# Patient Record
Sex: MALE | Race: WHITE | NOT HISPANIC OR LATINO | Employment: OTHER | URBAN - METROPOLITAN AREA
[De-identification: names, ages, dates, MRNs, and addresses within clinical notes are randomized per-mention and may not be internally consistent; named-entity substitution may affect disease eponyms.]

---

## 2018-03-27 ENCOUNTER — INITIAL CONSULT (OUTPATIENT)
Dept: DERMATOLOGY | Facility: CLINIC | Age: 83
End: 2018-03-27
Payer: MEDICARE

## 2018-03-27 VITALS
DIASTOLIC BLOOD PRESSURE: 68 MMHG | HEIGHT: 70 IN | HEART RATE: 72 BPM | WEIGHT: 180 LBS | BODY MASS INDEX: 25.77 KG/M2 | SYSTOLIC BLOOD PRESSURE: 103 MMHG

## 2018-03-27 DIAGNOSIS — D04.22 SQUAMOUS CELL CARCINOMA IN SITU OF SKIN OF EAR, LEFT: Primary | ICD-10-CM

## 2018-03-27 PROCEDURE — 99202 OFFICE O/P NEW SF 15 MIN: CPT | Mod: S$GLB,,, | Performed by: DERMATOLOGY

## 2018-03-27 PROCEDURE — 99999 PR PBB SHADOW E&M-NEW PATIENT-LVL III: CPT | Mod: PBBFAC,,, | Performed by: DERMATOLOGY

## 2018-03-27 RX ORDER — SPIRONOLACTONE 25 MG/1
12.5 TABLET ORAL DAILY
COMMUNITY
End: 2021-07-04

## 2018-03-27 RX ORDER — ASPIRIN 81 MG/1
81 TABLET ORAL DAILY
COMMUNITY
End: 2018-03-27

## 2018-03-27 RX ORDER — METOPROLOL SUCCINATE 50 MG/1
1 TABLET, EXTENDED RELEASE ORAL DAILY
COMMUNITY
Start: 2018-03-22

## 2018-03-27 RX ORDER — APIXABAN 5 MG/1
1 TABLET, FILM COATED ORAL 2 TIMES DAILY
COMMUNITY
Start: 2018-02-12 | End: 2021-03-15

## 2018-03-27 RX ORDER — CLOPIDOGREL BISULFATE 75 MG/1
1 TABLET ORAL DAILY
COMMUNITY
Start: 2018-01-31

## 2018-03-27 RX ORDER — ROSUVASTATIN CALCIUM 40 MG/1
1 TABLET, COATED ORAL DAILY
COMMUNITY
Start: 2018-03-22 | End: 2021-02-04

## 2018-03-27 RX ORDER — PANTOPRAZOLE SODIUM 20 MG/1
1 TABLET, DELAYED RELEASE ORAL DAILY
COMMUNITY
Start: 2018-02-26

## 2018-03-27 RX ORDER — FUROSEMIDE 20 MG/1
10 TABLET ORAL DAILY
COMMUNITY
End: 2021-06-27

## 2018-03-27 RX ORDER — SACUBITRIL AND VALSARTAN 49; 51 MG/1; MG/1
1 TABLET, FILM COATED ORAL 2 TIMES DAILY
COMMUNITY
Start: 2018-03-26 | End: 2021-07-04

## 2018-03-27 RX ORDER — DEXTROMETHORPHAN HYDROBROMIDE, GUAIFENESIN 5; 100 MG/5ML; MG/5ML
650 LIQUID ORAL EVERY 8 HOURS
COMMUNITY

## 2018-03-27 NOTE — PROGRESS NOTES
REFERRING MD:  Chandler Perez M.D.    CHIEF COMPLAINT:  New patient being consulted for Mohs' surgery evaluation.    HISTORY OF PRESENT ILLNESS:  86 y.o. male presents with a 3 month(s) history of growth on the L superior posterior helix. (+) scaly. Pt has a h/o Mohs with Dr. Oviedo in April 2008 on the lower helical rim of the left ear.    Negative for scabbing.  Positive for crusting.  Negative for bleeding.  Negative for itching.    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     No prior treatment.    Pacemaker: Yes  Defibrillator: Yes  Artificial joints: Yes- R knee and bilateral hips.  Artificial heart valves: No    PAST MEDICAL HISTORY:  Past Medical History:   Diagnosis Date    Atrial fibrillation     Congestive heart failure     Coronary artery disease     Defibrillator discharge     Diabetes mellitus     GERD (gastroesophageal reflux disease)     Pacemaker     Squamous cell carcinoma     Stroke     full recovery    URI (upper respiratory infection) 7/2015 and 10/2017       PAST SURGICAL HISTORY:  Past Surgical History:   Procedure Laterality Date    ESOPHAGEAL DILATION  1/2015 and 7/2015    POSTERIOR FUSION LUMBAR SPINE W/ CORPECTOMY      l1-2-3 (1948)    stend LAD  03/2014    TOTAL HIP ARTHROPLASTY      TOTAL KNEE ARTHROPLASTY Right         SOCIAL HISTORY:  Dependencies:  never smoked    PERTINENT MEDICATIONS:  See medications list.  Plavix and Apixaban    ALLERGIES:  Codeine and Penicillins    ROS:  Skin: See HPI  Constitutional: No fatigue, fever, malaise, weight loss, or night sweats.  Cardiovascular: No chest pain, palpitations, or edema.  Respiratory: No coughing, wheezing, SOB, or sputum production.    Physical Exam   Skin:               General: Mood and affect normal. Alert and orient X3. Normal appearance.  Eyelids:  no suspicious lesions  Head/Face: L superior posterior helix with a 10 x 10 mm crusted plaque located 3.5 cm inferiorly from the left superior ear attachment  and 7 cm superiorly from the left inferior lobe attachment.   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    PLAN:  The diagnosis and the pathology report were discussed in detail with the patient. Treatment options were reviewed, including Mohs Micrographic Surgery, radiation, topical therapy, and standard excision.  After careful review of patient's history and physical exam, and after discussion of treatment options, the decision was made to perform Mohs micrographic surgery.    Scheduled patient for Mohs Micrographic Surgery. Risks, benefits, and alternatives of Mohs' surgery discussed with the patient. Discussed repair options including complex closure, skin flap, skin graft and second intention healing with the patient. Pre-operative instructions provided to the patient. Will like to wait a few weeks for area to heal up prior to doing Mohs.  Also, he already obtained clearance from his cardiologist to stop Plavix 3 days prior and Eliquis 1 day prior to procedure.

## 2018-04-12 ENCOUNTER — PROCEDURE VISIT (OUTPATIENT)
Dept: DERMATOLOGY | Facility: CLINIC | Age: 83
End: 2018-04-12
Payer: MEDICARE

## 2018-04-12 VITALS
HEIGHT: 70 IN | WEIGHT: 180 LBS | DIASTOLIC BLOOD PRESSURE: 62 MMHG | SYSTOLIC BLOOD PRESSURE: 94 MMHG | HEART RATE: 69 BPM | BODY MASS INDEX: 25.77 KG/M2

## 2018-04-12 DIAGNOSIS — C44.229 SQUAMOUS CELL CARCINOMA OF SKIN OF HELIX, LEFT: Primary | ICD-10-CM

## 2018-04-12 PROCEDURE — 99499 UNLISTED E&M SERVICE: CPT | Mod: S$GLB,,, | Performed by: DERMATOLOGY

## 2018-04-12 PROCEDURE — 17311 MOHS 1 STAGE H/N/HF/G: CPT | Mod: S$GLB,,, | Performed by: DERMATOLOGY

## 2018-04-12 PROCEDURE — 17312 MOHS ADDL STAGE: CPT | Mod: S$GLB,,, | Performed by: DERMATOLOGY

## 2018-04-12 PROCEDURE — 13152 CMPLX RPR E/N/E/L 2.6-7.5 CM: CPT | Mod: 51,S$GLB,, | Performed by: DERMATOLOGY

## 2018-04-12 RX ORDER — AZITHROMYCIN 250 MG/1
250 TABLET, FILM COATED ORAL SEE ADMIN INSTRUCTIONS
Qty: 6 TABLET | Refills: 0 | Status: SHIPPED | OUTPATIENT
Start: 2018-04-12

## 2018-04-12 RX ORDER — TRAMADOL HYDROCHLORIDE 50 MG/1
50 TABLET ORAL 2 TIMES DAILY PRN
Qty: 15 TABLET | Refills: 0 | Status: SHIPPED | OUTPATIENT
Start: 2018-04-12

## 2018-04-12 NOTE — PROGRESS NOTES
PROCEDURE: Mohs' Micrographic Surgery    INDICATION: Location in mask areas of face including central face, nose, eyelids, eyebrows, lips, chin, preauricular, temple, and ear. Biopsy-proven skin cancer of cosmetically and functionally important areas, including head, neck, genital, hand, foot, or areas known for having difficulty in healing, such as the lower anterior legs. Tumor with ill-defined borders.    REFERRING MD: Chandler Perez M.D.    CASE NUMBER:     ANESTHETIC: 4.5 cc 0.5% Lidocaine with Epi 1:200,000 mixed 1:1 with 0.5% Bupivacaine    SURGICAL PREP: Betadine    SURGEON: Ayanna Vela MD    ASSISTANTS: Amy Marcelo PA-C and Tracie Brown, Surg Tech    PREOPERATIVE DIAGNOSIS: squamous cell carcinoma in situ    POSTOPERATIVE DIAGNOSIS: squamous cell carcinoma in situ    PATHOLOGIC DIAGNOSIS: squamous cell carcinoma in situ; superficially invasive SCC    HISTOLOGY OF SPECIMENS IN FIRST STAGE:   Debulking tumor specimen revealed superficially invasive SCC resulting in an addendum to the initial diagnosis.  Tumor Type: Tumor seen. Superficial squamous cell carcinoma: Proliferation of squamous cells exhibiting atypia and infiltrating within the superficial papillary dermis.  Invasive squamous cell carcinoma: Proliferation of squamous cells exhibiting atypia and infiltrating within the dermis.   Depth of Invasion: epidermis and dermis  Perineural Invasion: No    HISTOLOGY OF SPECIMENS IN SUBSEQUENT STAGES:  Tumor Type: Tumor seen. Epidermal margin missing.   Depth of Invasion: epidermis  Perineural Invasion: No    STAGES OF MOHS' SURGERY PERFORMED: 3    TUMOR-FREE PLANE ACHIEVED: Yes - with removal of underlying cartilage    HEMOSTASIS: electrocoagulation after magnet placed over defibrillator without event     SPECIMENS: 6 (3 in stage A, 2 in stage B and 1 in stage C)    LOCATION: left superior posterior helix. Patient verified location.    INITIAL LESION SIZE: 0.7 x 0.8 cm    FINAL DEFECT SIZE: 1.3 x 1.8  "cm    WOUND REPAIR/DISPOSITION: The patient tolerated Mohs' Micrographic Surgery for a squamous cell carcinoma in situ very well. When the tumor was completely removed, a repair of the surgical defect was undertaken.      PROCEDURE: Complex Linear Repair    INDICATION: Status post Mohs' Micrographic Surgery for squamous cell carcinoma in situ.    CASE NUMBER:     SURGEON: Ayanna Vela MD    ASSISTANTS: Amy Marcelo PA-C and Tracie Brown Surg Tech    ANESTHETIC: 2 cc 1% Lidocaine with Epinephrine 1:100,000    SURGICAL PREP: Betadine    LOCATION: left superior posterior helix    DEFECT SIZE: 1.3 x 1.8 cm    WOUND REPAIR/DISPOSITION:  After the patient's carcinoma had been completely removed with Mohs' Micrographic Surgery, a repair of the surgical defect was undertaken. The patient was returned to the operating suite where the area of left superior posterior helix was prepped, draped, and anesthetized in the usual sterile fashion. The wound was widely undermined in all directions. Then, electrocoagulation after magnet placed over defibrillator without event was used to obtain meticulous hemostasis. 5-0 Vicryl buried vertical mattress sutures were placed into the subcutaneous and dermal plane to close the wound and noel the cutaneous wound edge. Bilateral dog ears were identified and were removed by a standard Burow's triangle technique. The cutaneous wound edges were closed using interrupted 5-0 Prolene suture.    The patient tolerated the procedure well.    The area was cleaned and dressed appropriately and the patient was given wound care instructions, as well as appointment for follow-up evaluation. Pt was placed on Tramadol 50 mg BID prn postop pain and a Z-afsaneh.    LENGTH OF REPAIR: 3 cm    Vitals:    04/12/18 0752 04/12/18 1149   BP:  94/62   BP Location:  Right arm   Patient Position:  Sitting   BP Method:  Small (Automatic)   Pulse:  69   Weight: 81.6 kg (180 lb)    Height: 5' 10" (1.778 m)          "

## 2018-04-19 ENCOUNTER — CLINICAL SUPPORT (OUTPATIENT)
Dept: DERMATOLOGY | Facility: CLINIC | Age: 83
End: 2018-04-19
Payer: MEDICARE

## 2018-04-19 PROCEDURE — 99999 PR PBB SHADOW E&M-EST. PATIENT-LVL III: CPT | Mod: PBBFAC,,,

## 2018-04-19 NOTE — PROGRESS NOTES
86 y.o. male patient is here for suture removal following Mohs' surgery.    Patient reports no problems with left superior posterior helix.    WOUND PE:  The left superior posterior helix sutures intact. Wound healing well. Good skin edges. No signs or symptoms of infection.    IMPRESSION:  Healing operative site from Mohs' surgery SCC, left superior posterior helix s/p Mohs with CLC, postop day # 7.    PLAN:  Sutures removed today. Steri-strips applied.  Continue wound care.  Keep moist with Aquaphor.    RTC:  In 3-6 months with Chandler Perez M.D. for skin check or sooner if new concern arises.

## 2019-03-04 ENCOUNTER — TELEPHONE (OUTPATIENT)
Dept: NEUROLOGY | Facility: CLINIC | Age: 84
End: 2019-03-04

## 2019-03-04 NOTE — TELEPHONE ENCOUNTER
----- Message from Josiah Birmingham sent at 3/4/2019  2:52 PM CST -----  Contact: Maritza ( daughter ) @ [phone]  Caller is requesting a return phone call regarding 3-6th appt, pls call

## 2019-03-06 ENCOUNTER — OFFICE VISIT (OUTPATIENT)
Dept: NEUROLOGY | Facility: CLINIC | Age: 84
End: 2019-03-06
Payer: MEDICARE

## 2019-03-06 VITALS
DIASTOLIC BLOOD PRESSURE: 76 MMHG | HEART RATE: 84 BPM | HEIGHT: 70 IN | BODY MASS INDEX: 27.58 KG/M2 | WEIGHT: 192.69 LBS | SYSTOLIC BLOOD PRESSURE: 117 MMHG

## 2019-03-06 DIAGNOSIS — G56.02 CARPAL TUNNEL SYNDROME, LEFT: Primary | ICD-10-CM

## 2019-03-06 DIAGNOSIS — I48.20 CHRONIC A-FIB: ICD-10-CM

## 2019-03-06 DIAGNOSIS — E11.21 CONTROLLED TYPE 2 DIABETES MELLITUS WITH DIABETIC NEPHROPATHY, WITHOUT LONG-TERM CURRENT USE OF INSULIN: ICD-10-CM

## 2019-03-06 PROCEDURE — 99204 OFFICE O/P NEW MOD 45 MIN: CPT | Mod: GC,S$GLB,, | Performed by: STUDENT IN AN ORGANIZED HEALTH CARE EDUCATION/TRAINING PROGRAM

## 2019-03-06 PROCEDURE — 99204 PR OFFICE/OUTPT VISIT, NEW, LEVL IV, 45-59 MIN: ICD-10-PCS | Mod: GC,S$GLB,, | Performed by: STUDENT IN AN ORGANIZED HEALTH CARE EDUCATION/TRAINING PROGRAM

## 2019-03-06 PROCEDURE — 1101F PT FALLS ASSESS-DOCD LE1/YR: CPT | Mod: CPTII,GC,S$GLB, | Performed by: STUDENT IN AN ORGANIZED HEALTH CARE EDUCATION/TRAINING PROGRAM

## 2019-03-06 PROCEDURE — 99999 PR PBB SHADOW E&M-EST. PATIENT-LVL III: CPT | Mod: PBBFAC,GC,, | Performed by: STUDENT IN AN ORGANIZED HEALTH CARE EDUCATION/TRAINING PROGRAM

## 2019-03-06 PROCEDURE — 99999 PR PBB SHADOW E&M-EST. PATIENT-LVL III: ICD-10-PCS | Mod: PBBFAC,GC,, | Performed by: STUDENT IN AN ORGANIZED HEALTH CARE EDUCATION/TRAINING PROGRAM

## 2019-03-06 PROCEDURE — 1101F PR PT FALLS ASSESS DOC 0-1 FALLS W/OUT INJ PAST YR: ICD-10-PCS | Mod: CPTII,GC,S$GLB, | Performed by: STUDENT IN AN ORGANIZED HEALTH CARE EDUCATION/TRAINING PROGRAM

## 2019-03-06 NOTE — PROGRESS NOTES
"Neurology Clinic  Initial Consult    Patient Name: Theo Fitzgerald Sr.  MRN: [id number]    CC: L hand weakness/numbness    HPI: Theo Fitzgerald Sr. is a 87 y.o. male (left handed) w/ PMH significant for b/l hip replacement, R knee replacement, CAD s/p stenting x 1 (LAD), TIA 2017( aphasia), s/p lumbar spine fusion (L1-L3), DM(diet controlled, GERD/Esophageal stricture, Afib, CHFpacemaker/defibrillator presenting as referral from his  for evaluation of L handed pain and weakness.   Patient states that his L hand hurts and stings and that he also feels a pulsatile sensation in his hand at times.When asked to specify which fingers bother him the most he states that its his middle and ring fingers that hive him the most trouble. He states that the above mentioned symptoms + occasional feeling of "cold" and tingling wake the patient up at night. He states that shaking his hand can provide some relief. 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   Patient reports that he is still driving and enjoys spending time with his wife, their 6 kids and grandchildren. He also enjoys doing yard work.    Review of Systems:  General: No fevers, chills  Eyes: No changes in vision  ENT: No changes in hearing  Respiratory: No SOB  CV: No chest pain, palpitations  GI: No diarrhea, blood in stool  Urinary: No dysuria, hematuria  Skin: No rashes  Neurological: No weakness, confusion  Psychiatric: No auditory nor visual hallucinations      Past Medical History  Past Medical History:   Diagnosis Date    Atrial fibrillation     Congestive heart failure     Coronary artery " disease     Defibrillator discharge     Diabetes mellitus     GERD (gastroesophageal reflux disease)     Pacemaker     Squamous cell carcinoma     Stroke     full recovery    URI (upper respiratory infection) 7/2015 and 10/2017       Medications    Current Outpatient Medications:     acetaminophen (TYLENOL ARTHRITIS PAIN) 650 MG TbSR, Take 650 mg by mouth every 8 (eight) hours., Disp: , Rfl:     azithromycin (ZITHROMAX Z-MINI) 250 MG tablet, Take 1 tablet (250 mg total) by mouth As instructed. Take 2 pills on day 1 and 1 pill the next 4 days., Disp: 6 tablet, Rfl: 0    clopidogrel (PLAVIX) 75 mg tablet, Take 1 tablet by mouth once daily., Disp: , Rfl:     ELIQUIS 5 mg Tab, Take 1 tablet by mouth 2 (two) times daily., Disp: , Rfl:     ENTRESTO 49-51 mg per tablet, Take 1 tablet by mouth 2 (two) times daily., Disp: , Rfl:     furosemide (LASIX) 20 MG tablet, Take 10 mg by mouth once daily., Disp: , Rfl:     metoprolol succinate (TOPROL-XL) 50 MG 24 hr tablet, Take 1 tablet by mouth once daily., Disp: , Rfl:     pantoprazole (PROTONIX) 20 MG tablet, Take 1 tablet by mouth once daily., Disp: , Rfl:     rosuvastatin (CRESTOR) 40 MG Tab, Take 1 tablet by mouth once daily., Disp: , Rfl:     spironolactone (ALDACTONE) 25 MG tablet, Take 12.5 mg by mouth once daily., Disp: , Rfl:     traMADol (ULTRAM) 50 mg tablet, Take 1 tablet (50 mg total) by mouth 2 (two) times daily as needed for Pain., Disp: 15 tablet, Rfl: 0  Any other notable medications as documented in HPI    Allergies  Review of patient's allergies indicates:   Allergen Reactions    Codeine     Penicillins        Social History  Social History     Socioeconomic History    Marital status:      Spouse name: Not on file    Number of children: Not on file    Years of education: Not on file    Highest education level: Not on file   Social Needs    Financial resource strain: Not on file    Food insecurity - worry: Not on file    Food  "insecurity - inability: Not on file    Transportation needs - medical: Not on file    Transportation needs - non-medical: Not on file   Occupational History    Not on file   Tobacco Use    Smoking status: Never Smoker    Smokeless tobacco: Never Used   Substance and Sexual Activity    Alcohol use: No    Drug use: Not on file    Sexual activity: Not on file   Other Topics Concern    Not on file   Social History Narrative    Not on file     Any other notable Social History as documented in HPI.    Family History  No family history on file.  Any other notable FMH as documented in HPI.    Physical Exam  /76   Pulse 84   Ht 5' 10" (1.778 m)   Wt 87.4 kg (192 lb 10.9 oz)   BMI 27.65 kg/m²     General: Well-developed, well-groomed. No apparent distress. Pleasant, elderly gentleman.   HENT: Normocephalic, atraumatic.   Pinna nontender to palpation.  Ear canal clear and tympanic membrane with normal cone of light and no erythema nor bulge.  Cardiovascular: Regular rate and rhythm with no murmurs, rubs or gallops.    Chest: Lungs clear to auscultation bilaterally.  No wheezes, stridor, ronchi appreciated.  Abdomen: Normoactive bowel sounds present.  Soft, nontender to palpation.  Musculoskeletal: No peripheral edema    Neurologic Exam: The patient is awake, alert and oriented. Language is fluent.  Fund of knowledge is appropriate. Appropriate during the interview and enjoyed telling several jokes during his appointment.    Cranial nerves:   Pupils are round and reactive to light and accommodation.   Visual fields are full to confrontation.    Ocular motility is full in all cardinal positions of gaze.   Facial sensation is normal to light touch.   Facial activation is symmetric.   Hearing is symmetric bilaterally.   Palate elevates symmetrically.   Shoulder elevation is symmetric and full strength bilaterally.   Tongue is midline and neck rotation strength is normal bilaterally.     Motor examination of all " extremities demonstrates normal bulk and tone in all four limbs. There are no atrophy or fasciculations. Strength is 4+/5 in the upper and lower extremities bilaterally.    Sensory examination is normal light touch in BUE and BLE.  Romberg is negative.  Sensation to light touch: intact in BUE and BLE( up to ankle)  Sensation to temperature: intact in BUE and BLE  Sensation to vibration: intact in BUE and BLE  Sensation to pinprick: intact in BUE and BLE    Deep tendon reflexes are 1+ and symmetric in the upper, and absent in  lower extremities bilaterally.  No clonus.    Gait: Normal tandem, and casual gait.    Coordination: Finger to nose and heel to shin testing is normal in both upper and lower extremities.    Miscellaneous: Tinel's sign positive, Phalen's positive on exam  Thenar eminence atrophy present b/l, although R>L      Lab and Test Results    No results found for: WBC, HGB, HCT, PLT  No results found for: GLU, NA, K, CL, CO2, BUN, CREATININE, CALCIUM, MG, PHOS  No results found for: ALB, ALKPHOS, ALT, AST      Assessment and Plan    Problem List Items Addressed This Visit        Neuro    Carpal tunnel syndrome, left - Primary    Current Assessment & Plan     - History and exam consistent w/ the diagnosis   - EMG to establish the extent of nerve injury   - Once completed, will refer to Hand Clinic for evaluation   - Wrist splint to be worn at night  - Inquire w/ the PCP if low dose anti-inflammatory such as Advil 200 QHS is ok from renal and GI perspective  - Follows w/ Pain Management at           Relevant Orders    EMG W/ ULTRASOUND AND NERVE CONDUCTION TEST 2 Extremities       Cardiac/Vascular    Chronic a-fib    Current Assessment & Plan     - Continue Apixaban 5mg BID  - Continue Crestor 40 mg QHS  - Secondary stroke prevention measures           Other Visit Diagnoses     Controlled type 2 diabetes mellitus with diabetic nephropathy, without long-term current use of insulin                Lara  MD Marek   Neurology Resident   [facility]  [address] [place] [address]

## 2019-03-06 NOTE — PATIENT INSTRUCTIONS
- Wrist splint for carpal tunnel can be purchased at [employer] or Online  - Wear the splint at night every day   - EMG/Nerve conduction study to evaluate the nerve damage  - After will refer you to a specialist for possible carpal tunnel release surgery   - Check with PCP if OK to take 200 of Advil at night if renal function is OK

## 2019-03-10 PROBLEM — I48.20 CHRONIC A-FIB: Status: ACTIVE | Noted: 2019-03-10

## 2019-03-10 PROBLEM — E11.9 DM (DIABETES MELLITUS): Status: ACTIVE | Noted: 2019-03-10

## 2019-03-10 PROBLEM — G56.02 CARPAL TUNNEL SYNDROME, LEFT: Status: ACTIVE | Noted: 2019-03-10

## 2019-03-10 NOTE — ASSESSMENT & PLAN NOTE
- History and exam consistent w/ the diagnosis   - EMG to establish the extent of nerve injury   - Once completed, will refer to Hand Clinic for evaluation   - Wrist splint to be worn at night  - Inquire w/ the PCP if low dose anti-inflammatory such as Advil 200 QHS is ok from renal and GI perspective  - Follows w/ Pain Management at [facility]

## 2019-09-25 ENCOUNTER — TELEPHONE (OUTPATIENT)
Dept: OPHTHALMOLOGY | Facility: CLINIC | Age: 84
End: 2019-09-25

## 2019-09-25 NOTE — TELEPHONE ENCOUNTER
----- Message from Nisha Walters sent at 9/25/2019 10:27 AM CDT -----  Contact: Hilda (wife)  Pt wife stated she needed you to give her a call. Pt wife contact number is [phone].

## 2024-11-21 NOTE — ASSESSMENT & PLAN NOTE
- Continue Apixaban 5mg BID  - Continue Crestor 40 mg QHS  - Secondary stroke prevention measures   medium